# Patient Record
Sex: MALE | Race: WHITE | Employment: FULL TIME | URBAN - METROPOLITAN AREA
[De-identification: names, ages, dates, MRNs, and addresses within clinical notes are randomized per-mention and may not be internally consistent; named-entity substitution may affect disease eponyms.]

---

## 2022-01-28 ENCOUNTER — OFFICE VISIT (OUTPATIENT)
Dept: ORTHOPEDIC SURGERY | Age: 32
End: 2022-01-28
Payer: COMMERCIAL

## 2022-01-28 VITALS — HEIGHT: 72 IN | BODY MASS INDEX: 33.86 KG/M2 | WEIGHT: 250 LBS

## 2022-01-28 DIAGNOSIS — S93.402A MODERATE ANKLE SPRAIN, LEFT, INITIAL ENCOUNTER: ICD-10-CM

## 2022-01-28 DIAGNOSIS — M25.572 LEFT ANKLE PAIN, UNSPECIFIED CHRONICITY: Primary | ICD-10-CM

## 2022-01-28 PROCEDURE — 99213 OFFICE O/P EST LOW 20 MIN: CPT | Performed by: ORTHOPAEDIC SURGERY

## 2022-01-28 NOTE — PROGRESS NOTES
David Mejía (: 1990) is a 36 y.o. male, established patient, here for evaluation of the following chief complaint(s): Ankle Pain (left)       ASSESSMENT/PLAN:  Below is the assessment and plan developed based on review of pertinent history, physical exam, labs, studies, and medications. Findings were discussed with the patient today. We discussed regimen of ice, anti-inflammatories, physical therapy, home exercise program, and activity modifications. If there is continued pain and symptoms then we will plan for follow-up in the next 4-6 weeks for further evaluation and treatment planning. We discussed bracing that can help as well. 1. Left ankle pain, unspecified chronicity  -     XR ANKLE LT MIN 3 V; Future  2. Moderate ankle sprain, left, initial encounter      Return in about 4 weeks (around 2022), or if symptoms worsen or fail to improve. SUBJECTIVE/OBJECTIVE:  Estrella Davies (: 1990) is a 36 y.o. male. He notes an injury that occurred when he was playing ultimate Frisbee 2 weeks ago. He rolled his ankle and noted sharp swelling and aching pain in the ankle. He has tried ice and anti-inflammatories since this injury occurred. No Known Allergies    No current outpatient medications on file. No current facility-administered medications for this visit.        Social History     Socioeconomic History    Marital status: SINGLE     Spouse name: Not on file    Number of children: Not on file    Years of education: Not on file    Highest education level: Not on file   Occupational History    Not on file   Tobacco Use    Smoking status: Not on file    Smokeless tobacco: Not on file   Substance and Sexual Activity    Alcohol use: Not on file    Drug use: Not on file    Sexual activity: Not on file   Other Topics Concern    Not on file   Social History Narrative    Not on file     Social Determinants of Health     Financial Resource Strain:     Difficulty of Paying Living Expenses: Not on file   Food Insecurity:     Worried About Running Out of Food in the Last Year: Not on file    Ran Out of Food in the Last Year: Not on file   Transportation Needs:     Lack of Transportation (Medical): Not on file    Lack of Transportation (Non-Medical): Not on file   Physical Activity:     Days of Exercise per Week: Not on file    Minutes of Exercise per Session: Not on file   Stress:     Feeling of Stress : Not on file   Social Connections:     Frequency of Communication with Friends and Family: Not on file    Frequency of Social Gatherings with Friends and Family: Not on file    Attends Jehovah's witness Services: Not on file    Active Member of [address] [place] [address] [employer] or Organizations: Not on file    Attends Club or Organization Meetings: Not on file    Marital Status: Not on file   Intimate Partner Violence:     Fear of Current or Ex-Partner: Not on file    Emotionally Abused: Not on file    Physically Abused: Not on file    Sexually Abused: Not on file   Housing Stability:     Unable to Pay for Housing in the Last Year: Not on file    Number of [place] in the Last Year: Not on file    Unstable Housing in the Last Year: Not on file       History reviewed. No pertinent surgical history. History reviewed. No pertinent family history. REVIEW OF SYSTEMS:  ROS     Positive for: Musculoskeletal    Last edited by Yasmin Knapp on 1/28/2022  2:24 PM. (History)        Patient denies any recent fever, chills, nausea, vomiting, chest pain, or shortness of breath. Vitals:  Ht 6' (1.829 m)   Wt 250 lb (113.4 kg)   BMI 33.91 kg/m²    Body mass index is 33.91 kg/m². PHYSICAL EXAM:  General exam: Patient is awake, alert, and oriented x3. Well-appearing. No acute distress. Ambulates with a normal gait. Left ankle: Neurovascular and sensory intact. There is tenderness to palpation at the lateral ankle just distal to the fibula.   There is evidence of swelling and ecchymosis in this area. There is pain with calcaneal tilt testing. Mild tenderness to palpation at the medial ankle in the area of the deltoid ligament noted. No obvious instability on testing. Negative proximal squeeze test.  No tenderness to palpation at the fifth metatarsal.  Achilles tendon is palpable and intact. IMAGING:    XR Results (most recent):  Results from Appointment encounter on 01/28/22    XR ANKLE LT MIN 3 V    Narrative  X-rays of the left ankle three views done today show evidence of normal ankle joint space. No obvious acute fracture. No signs of medial clear space widening. Orders Placed This Encounter    XR ANKLE LT MIN 3 V     5     Standing Status:   Future     Number of Occurrences:   1     Standing Expiration Date:   5/28/2022              An electronic signature was used to authenticate this note.   -- Jonas Lunsford, DO

## 2022-06-01 ENCOUNTER — OFFICE VISIT (OUTPATIENT)
Dept: ORTHOPEDIC SURGERY | Age: 32
End: 2022-06-01
Payer: COMMERCIAL

## 2022-06-01 VITALS — HEIGHT: 72 IN | BODY MASS INDEX: 33.86 KG/M2 | WEIGHT: 250 LBS

## 2022-06-01 DIAGNOSIS — S93.402D MODERATE LEFT ANKLE SPRAIN, SUBSEQUENT ENCOUNTER: Primary | ICD-10-CM

## 2022-06-01 DIAGNOSIS — S76.311D HAMSTRING STRAIN, RIGHT, SUBSEQUENT ENCOUNTER: ICD-10-CM

## 2022-06-01 PROCEDURE — 99214 OFFICE O/P EST MOD 30 MIN: CPT | Performed by: ORTHOPAEDIC SURGERY

## 2022-06-01 RX ORDER — DICLOFENAC SODIUM 75 MG/1
75 TABLET, DELAYED RELEASE ORAL 2 TIMES DAILY
Qty: 60 TABLET | Refills: 3 | Status: SHIPPED | OUTPATIENT
Start: 2022-06-01

## 2022-06-01 NOTE — PROGRESS NOTES
Aleksander Mejía (: 1990) is a 29 y.o. male, established patient, here for evaluation of the following chief complaint(s): Ankle Pain       ASSESSMENT/PLAN:  Below is the assessment and plan developed based on review of pertinent history, physical exam, labs, studies, and medications. Findings were discussed with the patient today. We discussed regimen of ice, anti-inflammatories, physical therapy, home exercise program, and activity modifications. If there is continued pain and symptoms then we will plan for follow-up in the next 4-6 weeks for further evaluation and treatment planning. He will call us if he is continuing with left ankle pain and we will obtain an MRI to help with further treatment planning. This will help us to rule out an osteochondral defect. 1. Moderate left ankle sprain, subsequent encounter  -     REFERRAL TO PHYSICAL THERAPY  -     diclofenac EC (VOLTAREN) 75 mg EC tablet; Take 1 Tablet by mouth two (2) times a day., Normal, Disp-60 Tablet, R-3  2. Hamstring strain, right, subsequent encounter  -     REFERRAL TO PHYSICAL THERAPY  -     diclofenac EC (VOLTAREN) 75 mg EC tablet; Take 1 Tablet by mouth two (2) times a day., Normal, Disp-60 Tablet, R-3      Return in about 6 weeks (around 2022), or if symptoms worsen or fail to improve. SUBJECTIVE/OBJECTIVE:  Wayna Epley (: 1990) is a 29 y.o. male. She notes continued left ankle pain and swelling. He is now 5 months out from his initial injury. He has tried to increase his activity but has had difficulty with this. He describes anterior lateral and anterior medial ankle pain. No Known Allergies    Current Outpatient Medications   Medication Sig    diclofenac EC (VOLTAREN) 75 mg EC tablet Take 1 Tablet by mouth two (2) times a day. No current facility-administered medications for this visit.        Social History     Socioeconomic History    Marital status: SINGLE     Spouse name: Not on file    Number of children: Not on file    Years of education: Not on file    Highest education level: Not on file   Occupational History    Not on file   Tobacco Use    Smoking status: Not on file    Smokeless tobacco: Not on file   Substance and Sexual Activity    Alcohol use: Not on file    Drug use: Not on file    Sexual activity: Not on file   Other Topics Concern    Not on file   Social History Narrative    Not on file     Social Determinants of Health     Financial Resource Strain:     Difficulty of Paying Living Expenses: Not on file   Food Insecurity:     Worried About Running Out of Food in the Last Year: Not on file    Osmar of Food in the Last Year: Not on file   Transportation Needs:     Lack of Transportation (Medical): Not on file    Lack of Transportation (Non-Medical): Not on file   Physical Activity:     Days of Exercise per Week: Not on file    Minutes of Exercise per Session: Not on file   Stress:     Feeling of Stress : Not on file   Social Connections:     Frequency of Communication with Friends and Family: Not on file    Frequency of Social Gatherings with Friends and Family: Not on file    Attends Cheondoism Services: Not on file    Active Member of [address] [place] [address] or Organizations: Not on file    Attends Club or Organization Meetings: Not on file    Marital Status: Not on file   Intimate Partner Violence:     Fear of Current or Ex-Partner: Not on file    Emotionally Abused: Not on file    Physically Abused: Not on file    Sexually Abused: Not on file   Housing Stability:     Unable to Pay for Housing in the Last Year: Not on file    Number of [place] in the Last Year: Not on file    Unstable Housing in the Last Year: Not on file       History reviewed. No pertinent surgical history. History reviewed. No pertinent family history.             REVIEW OF SYSTEMS:  ROS     Positive for: Musculoskeletal    Last edited by Ayleen Tian on 6/1/2022  8:59 AM. (History) Patient denies any recent fever, chills, nausea, vomiting, chest pain, or shortness of breath. Vitals:  Ht 6' (1.829 m)   Wt 250 lb (113.4 kg)   BMI 33.91 kg/m²    Body mass index is 33.91 kg/m². PHYSICAL EXAM:  General exam: Patient is awake, alert, and oriented x3. Well-appearing. No acute distress. Ambulates with an antalgic gait    Right thigh: There is some continued tenderness palpation at the proximal hamstring origin. Normal stability of the hip and range of motion of the hip. Left ankle: Neurovascular and sensory intact. He does have continued tenderness palpation at the anterior ankle. Resisted dorsiflexion and eversion of the ankle recreates his pain. No obvious instability on testing. Mild swelling at the anterior lateral ankle. IMAGING:    XR Results (most recent):  Results from Appointment encounter on 01/28/22    XR ANKLE LT MIN 3 V    Narrative  X-rays of the left ankle three views done today show evidence of normal ankle joint space. No obvious acute fracture. No signs of medial clear space widening. Orders Placed This Encounter    REFERRAL TO PHYSICAL THERAPY     Referral Priority:   Routine     Referral Type:   PT/OT/ST     Referral Reason:   Specialty Services Required     Number of Visits Requested:   1    diclofenac EC (VOLTAREN) 75 mg EC tablet     Sig: Take 1 Tablet by mouth two (2) times a day. Dispense:  60 Tablet     Refill:  3              An electronic signature was used to authenticate this note.   -- Timothy Gracia DO

## 2022-07-18 ENCOUNTER — OFFICE VISIT (OUTPATIENT)
Dept: ORTHOPEDIC SURGERY | Age: 32
End: 2022-07-18
Payer: COMMERCIAL

## 2022-07-18 VITALS — HEIGHT: 72 IN | WEIGHT: 250 LBS | BODY MASS INDEX: 33.86 KG/M2

## 2022-07-18 DIAGNOSIS — S93.402D MODERATE LEFT ANKLE SPRAIN, SUBSEQUENT ENCOUNTER: Primary | ICD-10-CM

## 2022-07-18 DIAGNOSIS — M95.8 OSTEOCHONDRAL DEFECT OF TALUS: ICD-10-CM

## 2022-07-18 PROCEDURE — 99213 OFFICE O/P EST LOW 20 MIN: CPT | Performed by: ORTHOPAEDIC SURGERY

## 2022-07-18 NOTE — PROGRESS NOTES
Echo Mejía (: 1990) is a 29 y.o. male, established patient, here for evaluation of the following chief complaint(s): Ankle Pain and Leg Pain       ASSESSMENT/PLAN:  Below is the assessment and plan developed based on review of pertinent history, physical exam, labs, studies, and medications. We will obtain an MRI of the left ankle as I do have concerns for possible osteochondral defect versus partial tendon tear based on his continued pain despite 6 months of conservative treatment. I will plan to see him back after the MRI is performed. He has tried all conservative treatment options for his right hamstring strain as well. 1. Moderate left ankle sprain, subsequent encounter  2. Osteochondral defect of talus      Return for imaging results after study performed. SUBJECTIVE/OBJECTIVE:  Ramila Rene (: 1990) is a 29 y.o. male. He notes continued right hamstring pain and left ankle pain. He has tried months of conservative treatment including physical therapy, anti-inflammatories, and activity modifications. No Known Allergies    Current Outpatient Medications   Medication Sig    diclofenac EC (VOLTAREN) 75 mg EC tablet Take 1 Tablet by mouth two (2) times a day. No current facility-administered medications for this visit.        Social History     Socioeconomic History    Marital status: SINGLE     Spouse name: Not on file    Number of children: Not on file    Years of education: Not on file    Highest education level: Not on file   Occupational History    Not on file   Tobacco Use    Smoking status: Not on file    Smokeless tobacco: Not on file   Substance and Sexual Activity    Alcohol use: Not on file    Drug use: Not on file    Sexual activity: Not on file   Other Topics Concern    Not on file   Social History Narrative    Not on file     Social Determinants of Health     Financial Resource Strain:     Difficulty of Paying Living Expenses: Not on file Food Insecurity:     Worried About Running Out of Food in the Last Year: Not on file    Osmar of Food in the Last Year: Not on file   Transportation Needs:     Lack of Transportation (Medical): Not on file    Lack of Transportation (Non-Medical): Not on file   Physical Activity:     Days of Exercise per Week: Not on file    Minutes of Exercise per Session: Not on file   Stress:     Feeling of Stress : Not on file   Social Connections:     Frequency of Communication with Friends and Family: Not on file    Frequency of Social Gatherings with Friends and Family: Not on file    Attends Latter day Services: Not on file    Active Member of [address] [place] [address] [employer] or Organizations: Not on file    Attends Club or Organization Meetings: Not on file    Marital Status: Not on file   Intimate Partner Violence:     Fear of Current or Ex-Partner: Not on file    Emotionally Abused: Not on file    Physically Abused: Not on file    Sexually Abused: Not on file   Housing Stability:     Unable to Pay for Housing in the Last Year: Not on file    Number of [place] in the Last Year: Not on file    Unstable Housing in the Last Year: Not on file       No past surgical history on file. No family history on file. REVIEW OF SYSTEMS:  ROS     Positive for: Musculoskeletal    Last edited by Soledad Green on 7/18/2022  9:58 AM. (History)        Patient denies any recent fever, chills, nausea, vomiting, chest pain, or shortness of breath. Vitals:  Ht 6' (1.829 m)   Wt 250 lb (113.4 kg)   BMI 33.91 kg/m²    Body mass index is 33.91 kg/m². PHYSICAL EXAM:  General exam: Patient is awake, alert, and oriented x3. Well-appearing. No acute distress. Ambulates with a normal gait. Left ankle: He has generalized pain with range of motion of the ankle. Some limitation in dorsiflexion plantarflexion noted. Mild crepitus with motion. There is good strength with dorsiflexion and plantarflexion.     Right thigh: There is continued pain with resisted knee flexion. He localizes pain to the proximal hamstring. Normal strength and stability is noted. IMAGING:    XR Results (most recent):  Results from Appointment encounter on 01/28/22    XR ANKLE LT MIN 3 V    Narrative  X-rays of the left ankle three views done today show evidence of normal ankle joint space. No obvious acute fracture. No signs of medial clear space widening. No orders of the defined types were placed in this encounter. An electronic signature was used to authenticate this note.   -- Jesenia Nice DO

## 2022-07-19 DIAGNOSIS — M95.8 OSTEOCHONDRAL DEFECT OF TALUS: Primary | ICD-10-CM

## 2022-08-03 ENCOUNTER — HOSPITAL ENCOUNTER (OUTPATIENT)
Dept: MRI IMAGING | Age: 32
Discharge: HOME | End: 2022-08-03
Attending: ORTHOPAEDIC SURGERY
Payer: COMMERCIAL

## 2022-08-03 DIAGNOSIS — M95.8 OSTEOCHONDRAL DEFECT OF TALUS: ICD-10-CM

## 2022-08-03 PROCEDURE — 73721 MRI JNT OF LWR EXTRE W/O DYE: CPT

## 2022-08-26 ENCOUNTER — OFFICE VISIT (OUTPATIENT)
Dept: ORTHOPEDIC SURGERY | Age: 32
End: 2022-08-26
Payer: COMMERCIAL

## 2022-08-26 VITALS — WEIGHT: 250 LBS | HEIGHT: 72 IN | BODY MASS INDEX: 33.86 KG/M2

## 2022-08-26 DIAGNOSIS — M89.9 OSTEOCHONDRAL LESION OF TALAR DOME: Primary | ICD-10-CM

## 2022-08-26 DIAGNOSIS — G89.29 CHRONIC PAIN OF LEFT ANKLE: ICD-10-CM

## 2022-08-26 DIAGNOSIS — M25.572 CHRONIC PAIN OF LEFT ANKLE: ICD-10-CM

## 2022-08-26 DIAGNOSIS — M94.9 OSTEOCHONDRAL LESION OF TALAR DOME: Primary | ICD-10-CM

## 2022-08-26 PROCEDURE — 99213 OFFICE O/P EST LOW 20 MIN: CPT | Performed by: ORTHOPAEDIC SURGERY

## 2022-08-26 NOTE — LETTER
9/5/2022    Patient: Davina Vasquez   YOB: 1990   Date of Visit: 8/26/2022     DO Liliya Levi  [facility] [address] [place] [address]  Via In Basket    Dear Martin Adhikari DO,      Thank you for referring Mr. Crow Morgan to [facility] for evaluation. My notes for this consultation are attached. If you have questions, please do not hesitate to call me. I look forward to following your patient along with you.       Sincerely,    Christophe Sanchez MD

## 2022-08-26 NOTE — PROGRESS NOTES
Olivia Mejía (: 1990) is a 29 y.o. male, patient,here for evaluation of the following   Chief Complaint   Patient presents with    Ankle Pain        ASSESSMENT/PLAN:  Below is the assessment and plan developed based on review of pertinent history, physical exam, labs, studies, and medications. 1. Osteochondral lesion of talar dome  -     CT LOW EXT LT WO CONT; Future  2. Chronic pain of left ankle  -     XR STANDING ANKLE LT MIN 3 V; Future      The patient informed of findings on exam, x-rays and reviewed MRI in detail. This is a rather large osteochondral lesion, suspected reason for symptoms. We discussed surgical treatment, to further identify the exact size of the osteochondral lesion, I do recommend a CT scan without contrast as that will provide a better measuring of the talus to determine what type of procedure would be best for him. It is a possibility that he may require a medial malleolus osteotomy with the osteochondral lesion procedure which could be an allograft procedure. The CT scan will be more helpful in determining the exact size for surgical planning. In the meantime, activity modification, stretching program as demonstrated today and appropriate shoe wear and insoles. Further discussion about surgical plan will be had once the CT scan reviewed. Patient agrees with plan as he wishes to proceed with surgical treatment as this has become a chronic problem. Return for review CT scan when completed, treatment as indicated. No Known Allergies    Current Outpatient Medications   Medication Sig    diclofenac EC (VOLTAREN) 75 mg EC tablet Take 1 Tablet by mouth two (2) times a day. No current facility-administered medications for this visit. No past medical history on file. No past surgical history on file. No family history on file.     Social History     Socioeconomic History    Marital status: SINGLE     Spouse name: Not on file    Number of children: Not on file Years of education: Not on file    Highest education level: Not on file   Occupational History    Not on file   Tobacco Use    Smoking status: Not on file    Smokeless tobacco: Not on file   Substance and Sexual Activity    Alcohol use: Not on file    Drug use: Not on file    Sexual activity: Not on file   Other Topics Concern    Not on file   Social History Narrative    Not on file     Social Determinants of Health     Financial Resource Strain: Not on file   Food Insecurity: Not on file   Transportation Needs: Not on file   Physical Activity: Not on file   Stress: Not on file   Social Connections: Not on file   Intimate Partner Violence: Not on file   Housing Stability: Not on file           Vitals:  Ht 6' (1.829 m)   Wt 250 lb (113.4 kg)   BMI 33.91 kg/m²    Body mass index is 33.91 kg/m². SUBJECTIVE:  Florence Harjit (: 1990)   New patient presents today with complaint of left ankle pain and wants to discuss surgical treatment for an OCD lesion. Patient was seen by Dr. Marifer Pereira on August 3, 2022 and an MRI was obtained which identified osteochondral lesion. He describes initial injury back in 2022 as a \"bad sprain\" and he saw Dr. Marifer Pereira for that initial injury. He had persistent sharp pain so eventually the MRI was ordered. Patient states he was doing sports activities when he had this injury and physical therapy was something he went through recently and finished in 2022 but did not help his symptoms. He continues to have moderate sharp constant pain worse with stairs/steps, running walking. He has tried anti-inflammatory medications. He has been followed by Dr. Marifer Pereira from January to 2022. He is not diabetic, non-smoker. OBJECTIVE EXAM:     Visit Vitals  Ht 6' (1.829 m)   Wt 250 lb (113.4 kg)   BMI 33.91 kg/m²       Appearance: Alert, well appearing and pleasant patient who is in no distress, oriented to person, place/time, and who follows commands.  This patient is accompanied in the       office by his  self. Psychiatric: Affect and mood are appropriate. No dementia noted on examination  Musculoskeletal:  LOCATION: Diffuse tenderness to palpation around the ankle area, left lower extremity. Integumentary: No rashes, skin patches, wounds, or abrasions to the right or left legs       Warm and normal color. No regions of expressible drainage. Gait: Normal      Tenderness: No tenderness        Motor/Strength/Tone Exam: Normal       Sensory Exam:   Intact Normal Sensation to ankle/foot      Stability Testing: No anterolateral or varus instability of the Ankle or Subtalar Joints               No peroneal tendon instability noted      ROM: Normal ROM noted to ankle/foot      Contractures: No Achilles or Gastrocnemius Contractures      Calf tenderness: Absent for calf or gastrocnemius muscle regions       Soft, supple, non tender, non taut lower extremity compartment  Alignment:      NEUTRAL Hindfoot,         none Metatarsus Adductus Metatarsus   Wounds/Abrasions:    None present  Extremities:   No embolic phenomena to the toes          No significant edema to the foot and or toes. Lower extremities are warm and appear well perfused    DVT: No evidence of DVT seen on examination at this time     No calf swelling, no tenderness to calf muscles  Lymphatic:  No Evidence of Lymphedema  Vascular: Medial Border of Tibia Region: Edema is not present         Pulses: Dorsalis Pedis &  Posterior Tibial Pulses : Palpable yes        Varicosities Lower Limbs :  None  noted  Neuro: Negative bilateral Straight leg raise (seated position)    See Musculoskeletal section for pertinent individual extremity examination    No abnormal hand/wrist, foot/ankle, or facial/neck tremors. Lower Extremity/Ankle/Foot:  Mild antalgic gait, satisfactory weightbearing stance.     Left lower extremity/ankle: Not significant amount of swelling present, there is tenderness to palpation around the joint space and includes also the lateral side and medial aspect of ankle joint, no obvious clicking sensation on exam.  Ligaments are grossly stable for lateral talar tilt stress, there is a 1+ anterior drawer, medial and lateral malleolus nontender, Achilles tendon intact with negative Ulloa test, negative ankle squeeze test.    Left foot: Nontender, no swelling, ligaments grossly stable. Full active and passive range of motion toes, strength 5/5. Contralateral lower extremity/ankle /foot exam:  Nontender, no swelling ligaments grossly stable. Normal weightbearing stance. Neurovascular exam is grossly intact, dorsalis pedis pulse is palpable. Imaging:    XR Results (most recent):  Results from Appointment encounter on 08/26/22    XR STANDING ANKLE LT MIN 3 V    Narrative  Left ankle standing AP, lateral and oblique x-rays show no acute fractures or dislocations, on these views no obvious lateral talar dome osteochondral lesion, possible medial talar dome, osteochondral lesion notable on 2 views. Lateral view shows an os trigonum, normal joint space, there is a talar neck bone spur. Normal bone density, no acute abnormalities. The MRI without contrast dated August 3, 2022 is reviewed, these films are reviewed on PACS and it confirms an osteochondral lesion at the medial talar dome with reported extensive osteochondral derangement and there is instability to the area with a displaced fragment noted. Based on the MRI this is a rather large osteochondral lesion measuring about 18 mm on AP and about 6 mm in transverse alignment. There is some underlying osteoarthritis although not severe. There is chronic sprains of the ATFL and CFL. Might also be a chronic sprain of the distal tibiofibular ligament. The detailed radiology report in chart, summary is below.     IMPRESSION  Extensive osteochondral derangement of the medial talar dome with  concern for instability though no displaced fragment is definitely demonstrated. 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 2. Mild talonavicular osteoarthrosis. 3. Chronic sprains of anterior talofibular and calcaneofibular ligaments. There  may also be chronic sprain of the anterior distal tibiofibular ligament. An electronic signature was used to authenticate this note.   -- Rose Lewis MD

## 2022-09-06 ENCOUNTER — HOSPITAL ENCOUNTER (OUTPATIENT)
Dept: CT IMAGING | Age: 32
Discharge: HOME | End: 2022-09-06
Attending: ORTHOPAEDIC SURGERY
Payer: COMMERCIAL

## 2022-09-06 DIAGNOSIS — M94.9 OSTEOCHONDRAL LESION OF TALAR DOME: ICD-10-CM

## 2022-09-06 DIAGNOSIS — M89.9 OSTEOCHONDRAL LESION OF TALAR DOME: ICD-10-CM

## 2022-09-06 PROCEDURE — 73700 CT LOWER EXTREMITY W/O DYE: CPT

## 2022-09-22 ENCOUNTER — OFFICE VISIT (OUTPATIENT)
Dept: ORTHOPEDIC SURGERY | Age: 32
End: 2022-09-22
Payer: COMMERCIAL

## 2022-09-22 VITALS — HEIGHT: 72 IN | WEIGHT: 250 LBS | BODY MASS INDEX: 33.86 KG/M2

## 2022-09-22 DIAGNOSIS — M94.9 OSTEOCHONDRAL LESION OF TALAR DOME: Primary | ICD-10-CM

## 2022-09-22 DIAGNOSIS — G89.29 CHRONIC PAIN OF LEFT ANKLE: ICD-10-CM

## 2022-09-22 DIAGNOSIS — M89.9 OSTEOCHONDRAL LESION OF TALAR DOME: Primary | ICD-10-CM

## 2022-09-22 DIAGNOSIS — M25.572 CHRONIC PAIN OF LEFT ANKLE: ICD-10-CM

## 2022-09-22 PROCEDURE — 99213 OFFICE O/P EST LOW 20 MIN: CPT | Performed by: ORTHOPAEDIC SURGERY

## 2022-09-22 NOTE — PROGRESS NOTES
Echo Mejía (: 1990) is a 29 y.o. male, patient,here for evaluation of the following   Chief Complaint   Patient presents with    Arm Pain    Ankle Pain        ASSESSMENT/PLAN:  Below is the assessment and plan developed based on review of pertinent history, physical exam, labs, studies, and medications. 1. Osteochondral lesion of talar dome  2. Chronic pain of left ankle    Patient is informed of findings on exam, the x-rays were reviewed and we also reviewed the MRI and CT scan. Based on the CT scan this is a rather large osteochondral lesion that I would do best with allograft bone plug repair of the osteochondral lesion. The procedure would have to be done with an open procedure, probable arthroscopic assisted with an anterior versus medial ankle incision for open procedure. The medial incision will be for osteotomy of the medial malleolus if that is needed. Based on the CT and MRI reviewed, the lesion is posterior, so anticipate the possibility of needing to do a medial malleolus osteotomy to open up the medial side to do the procedure. This of course would then require fixation after procedure. All of this have been discussed with the patient at length, I discussed the surgical plan, risks, potential complications, expected outcomes, postoperative limitation time needed for recovery. All questions were answered, no guarantees are made, informed consent will be obtained for left ankle osteochondral talar dome defect repair with allograft bone plug, possible need for medial malleolus osteotomy. This will be done on an outpatient basis at [facility], the location may change pending the surgery can be done at the surgery center. Tentative date for surgery is 2022. When patient returns postoperatively we will get an x-ray of the left ankle 3 views nonweightbearing after the splint removed. Return for repeat xrays, post surgical follow up.     The patient has not undergone any procedures for this osteochondral lesion, we had scheduled for a more extensive procedure but after further evaluation, a decision was made to move forward with the more simple procedure first to evaluate the joint arthroscopically. This would be an arthroscopic osteochondral lesion evaluation and debridement as indicated. This will help also in future with other open procedures if the initial procedure fails. The patient has been rescheduled for surgical procedure to include only the left ankle arthroscopy osteochondral lesion debridement at [facility] on 2022, [place] [address]. No Known Allergies    Current Outpatient Medications   Medication Sig    diclofenac EC (VOLTAREN) 75 mg EC tablet Take 1 Tablet by mouth two (2) times a day. No current facility-administered medications for this visit. No past medical history on file. No past surgical history on file. No family history on file. Social History     Socioeconomic History    Marital status: SINGLE     Spouse name: Not on file    Number of children: Not on file    Years of education: Not on file    Highest education level: Not on file   Occupational History    Not on file   Tobacco Use    Smoking status: Not on file    Smokeless tobacco: Not on file   Substance and Sexual Activity    Alcohol use: Not on file    Drug use: Not on file    Sexual activity: Not on file   Other Topics Concern    Not on file   Social History Narrative    Not on file     Social Determinants of Health     Financial Resource Strain: Not on file   Food Insecurity: Not on file   Transportation Needs: Not on file   Physical Activity: Not on file   Stress: Not on file   Social Connections: Not on file   Intimate Partner Violence: Not on file   Housing Stability: Not on file           Vitals:  Ht 6' (1.829 m)   Wt 250 lb (113.4 kg)   BMI 33.91 kg/m²    Body mass index is 33.91 kg/m².             SUBJECTIVE:  Leah Mejía (: 1990)   Patient returns for follow-up after CT scan of left ankle obtained. Patient initially referred and presented on August 26, 2022 with complaint of left ankle pain and wanted to discuss surgical treatment for an OCD lesion. Patient was seen by Dr. Bernardo Cuadra on August 3, 2022 and an MRI was obtained which identified osteochondral lesion. He describes initial injury back in January 2022 as a \"bad sprain\" and he saw Dr. Bernardo Cuadra for that initial injury. He had persistent sharp pain so eventually the MRI was ordered. Patient states he was doing sports activities when he had this injury and physical therapy was something he went through recently and finished in July 2022 but did not help his symptoms. He continues to have moderate sharp constant pain worse with stairs/steps, running walking. He has tried anti-inflammatory medications. He has been followed by Dr. Bernardo Cuadra from January to August of 2022. He is not diabetic, non-smoker. CT scan was ordered to evaluate the exact size of the osteochondral lesion preop planning. OBJECTIVE EXAM:     Visit Vitals  Ht 6' (1.829 m)   Wt 250 lb (113.4 kg)   BMI 33.91 kg/m²       Appearance: Alert, well appearing and pleasant patient who is in no distress, oriented to person, place/time, and who follows commands. This patient is accompanied in the       office by his  self. Psychiatric: Affect and mood are appropriate. No dementia noted on examination  Musculoskeletal:  LOCATION: Diffuse tenderness anterior lateral and medial ankle joint of left lower extremity. Integumentary: No rashes, skin patches, wounds, or abrasions to the right or left legs       Warm and normal color. No regions of expressible drainage.       Gait: Normal      Tenderness: No tenderness        Motor/Strength/Tone Exam: Normal       Sensory Exam:   Intact Normal Sensation to ankle/foot      Stability Testing: No anterolateral or varus instability of the Ankle or Subtalar Joints               No peroneal tendon instability noted      ROM: Normal ROM noted to ankle/foot      Contractures: No Achilles or Gastrocnemius Contractures      Calf tenderness: Absent for calf or gastrocnemius muscle regions       Soft, supple, non tender, non taut lower extremity compartment  Alignment:      NEUTRAL Hindfoot,         none Metatarsus Adductus Metatarsus   Wounds/Abrasions:    None present  Extremities:   No embolic phenomena to the toes          No significant edema to the foot and or toes. Lower extremities are warm and appear well perfused    DVT: No evidence of DVT seen on examination at this time     No calf swelling, no tenderness to calf muscles  Lymphatic:  No Evidence of Lymphedema  Vascular: Medial Border of Tibia Region: Edema is not present         Pulses: Dorsalis Pedis &  Posterior Tibial Pulses : Palpable yes        Varicosities Lower Limbs :  None  noted  Neuro: Negative bilateral Straight leg raise (seated position)    See Musculoskeletal section for pertinent individual extremity examination    No abnormal hand/wrist, foot/ankle, or facial/neck tremors. Lower Extremity/Ankle/Foot:    Mild antalgic gait, satisfactory weightbearing stance. Left lower extremity/ankle: Not significant amount of swelling present, there is tenderness to palpation around the joint space and includes also the lateral side and medial aspect of ankle joint, no obvious clicking sensation on exam.  Ligaments are grossly stable for lateral talar tilt stress, there is a 1+ anterior drawer, medial and lateral malleolus nontender, Achilles tendon intact with negative Ulloa test, negative ankle squeeze test.     Left foot: Nontender, no swelling, ligaments grossly stable. Full active and passive range of motion toes, strength 5/5. Contralateral lower extremity/ankle /foot exam:  Nontender, no swelling ligaments grossly stable. Normal weightbearing stance.     Neurovascular exam intact for dorsalis pedis pulse palpable, light touch sensation intact, capillary refill brisk, strength for flexion/extension toes, dorsiflexion/plantarflexion ankle and foot 5/5. Imaging:    No x-rays obtained today but reviewed the previous x-rays obtained from August 26, 2022. XR Results (most recent):  Results from Appointment encounter on 08/26/22    XR STANDING ANKLE LT MIN 3 V    Narrative  Left ankle standing AP, lateral and oblique x-rays show no acute fractures or dislocations, on these views no obvious lateral talar dome osteochondral lesion, possible medial talar dome, osteochondral lesion notable on 2 views. Lateral view shows an os trigonum, normal joint space, there is a talar neck bone spur. Normal bone density, no acute abnormalities. MRI from previous evaluation also reviewed today to compare to the CT scan obtained on September 6, 2022. The CT scan is reviewed on PACS, it confirms the exact size of the osteochondral lesion which is measuring about 2.3 cm x 0.8 cm in axial dimension and about 0.9 cm in depth. There is also mild tibiotalar osteoarthritis with mild subtalar joint arthritis. The lesion is on the medial talar dome, the medial shoulder of the talus is intact. The MRI and CT scan summaries are below. The MRI without contrast dated August 3, 2022 is reviewed, these films are reviewed on PACS and it confirms an osteochondral lesion at the medial talar dome with reported extensive osteochondral derangement and there is instability to the area with a displaced fragment noted. Based on the MRI this is a rather large osteochondral lesion measuring about 18 mm on AP and about 6 mm in transverse alignment. There is some underlying osteoarthritis although not severe. There is chronic sprains of the ATFL and CFL. Might also be a chronic sprain of the distal tibiofibular ligament. The detailed radiology report in chart, summary is below.      IMPRESSION  Extensive osteochondral derangement of the medial talar dome with  concern for instability though no displaced fragment is definitely demonstrated. 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 2. Mild talonavicular osteoarthrosis. 3. Chronic sprains of anterior talofibular and calcaneofibular ligaments. There  may also be chronic sprain of the anterior distal tibiofibular ligament. An electronic signature was used to authenticate this note    IMPRESSION  1. Medial talar dome osteochondral lesion with measurements as above. Mild  tibiotalar and subtalar osteoarthritis. 2.  Chronic osseous fragment anterior to the medial malleolus. 3.  Suspected chronic sprain of ATFL and calcaneofibular ligament. An electronic signature was used to authenticate this note.   -- Doretha Ramsay MD

## 2022-09-28 DIAGNOSIS — M25.572 CHRONIC PAIN OF LEFT ANKLE: ICD-10-CM

## 2022-09-28 DIAGNOSIS — M89.9 OSTEOCHONDRAL LESION OF TALAR DOME: Primary | ICD-10-CM

## 2022-09-28 DIAGNOSIS — G89.29 CHRONIC PAIN OF LEFT ANKLE: ICD-10-CM

## 2022-09-28 DIAGNOSIS — M94.9 OSTEOCHONDRAL LESION OF TALAR DOME: Primary | ICD-10-CM

## 2022-10-12 DIAGNOSIS — M94.9 OSTEOCHONDRAL LESION OF TALAR DOME: Primary | ICD-10-CM

## 2022-10-12 DIAGNOSIS — M25.572 CHRONIC PAIN OF LEFT ANKLE: ICD-10-CM

## 2022-10-12 DIAGNOSIS — G89.29 CHRONIC PAIN OF LEFT ANKLE: ICD-10-CM

## 2022-10-12 DIAGNOSIS — M89.9 OSTEOCHONDRAL LESION OF TALAR DOME: Primary | ICD-10-CM

## 2022-10-24 DIAGNOSIS — G89.18 ACUTE POST-OPERATIVE PAIN: Primary | ICD-10-CM

## 2022-10-24 RX ORDER — OXYCODONE HYDROCHLORIDE 5 MG/1
5 TABLET ORAL
Qty: 20 TABLET | Refills: 0 | Status: SHIPPED | OUTPATIENT
Start: 2022-10-24 | End: 2022-10-27

## 2022-11-11 ENCOUNTER — OFFICE VISIT (OUTPATIENT)
Dept: ORTHOPEDIC SURGERY | Age: 32
End: 2022-11-11
Payer: COMMERCIAL

## 2022-11-11 VITALS — WEIGHT: 250 LBS | BODY MASS INDEX: 33.86 KG/M2 | HEIGHT: 72 IN

## 2022-11-11 DIAGNOSIS — M25.572 CHRONIC PAIN OF LEFT ANKLE: ICD-10-CM

## 2022-11-11 DIAGNOSIS — Z09 SURGICAL FOLLOW-UP CARE: Primary | ICD-10-CM

## 2022-11-11 DIAGNOSIS — M94.9 OSTEOCHONDRAL LESION OF TALAR DOME: ICD-10-CM

## 2022-11-11 DIAGNOSIS — M89.9 OSTEOCHONDRAL LESION OF TALAR DOME: ICD-10-CM

## 2022-11-11 DIAGNOSIS — G89.29 CHRONIC PAIN OF LEFT ANKLE: ICD-10-CM

## 2022-11-11 PROCEDURE — 99024 POSTOP FOLLOW-UP VISIT: CPT | Performed by: ORTHOPAEDIC SURGERY

## 2022-11-11 NOTE — PROGRESS NOTES
Riana Mejía (: 1990) is a 29 y.o. male, patient,here for evaluation of the following   Chief Complaint   Patient presents with    Surgical Follow-up    Ankle Pain        ASSESSMENT/PLAN:  Below is the assessment and plan developed based on review of pertinent history, physical exam, labs, studies, and medications. 1. Surgical follow-up care  -     XR ANKLE LT MIN 3 V; Future  2. Osteochondral lesion of talar dome  -     XR ANKLE LT MIN 3 V; Future  3. Chronic pain of left ankle  -     XR ANKLE LT MIN 3 V; Future      Patient informed of findings on exam today and also reviewed the arthroscopic photos that were obtained and I gave the photos to the patient for his records. We reviewed the size of the lesion and where the lesions are present, and briefly discussed the next step in treatment which is the allograft procedure if this arthroscopic procedure does not work well for him. He is definitely a candidate for the next procedure due to the size of the lesion. Further discussion will be had in the future for now were going to treat this as a postoperative evaluation and the sutures have been removed Steri-Strips applied and I will see him again in a few weeks, no x-rays are needed next time. Return in about 4 weeks (around 2022). No Known Allergies    Current Outpatient Medications   Medication Sig    diclofenac EC (VOLTAREN) 75 mg EC tablet Take 1 Tablet by mouth two (2) times a day. No current facility-administered medications for this visit. No past medical history on file. No past surgical history on file. No family history on file.     Social History     Socioeconomic History    Marital status: SINGLE     Spouse name: Not on file    Number of children: Not on file    Years of education: Not on file    Highest education level: Not on file   Occupational History    Not on file   Tobacco Use    Smoking status: Not on file    Smokeless tobacco: Not on file   Substance and Sexual Activity    Alcohol use: Not on file    Drug use: Not on file    Sexual activity: Not on file   Other Topics Concern    Not on file   Social History Narrative    Not on file     Social Determinants of Health     Financial Resource Strain: Not on file   Food Insecurity: Not on file   Transportation Needs: Not on file   Physical Activity: Not on file   Stress: Not on file   Social Connections: Not on file   Intimate Partner Violence: Not on file   Housing Stability: Not on file           Vitals:  Ht 6' (1.829 m)   Wt 250 lb (113.4 kg)   BMI 33.91 kg/m²    Body mass index is 33.91 kg/m². SUBJECTIVE:  Cy Goes Speeg (: 1990)   Patient has been 2 weeks postop, he underwent procedure 2 weeks ago which was arthroscopic debridement of osteochondral lesions. These were rather large lesions identified and debridement completed. Today he is doing okay he is not in severe pain and has no complaints. His main symptoms started around 2022 and has been ongoing and to present time. We had planned larger procedures of allograft but decided to move forward with the arthroscopic evaluation first to determine the amount of lesion present. Other procedures are tentatively scheduled for 2022. Overall he is doing very well that is post procedure, no chest pain or shortness of breath, calf pain or swelling. OBJECTIVE EXAM:     Visit Vitals  Ht 6' (1.829 m)   Wt 250 lb (113.4 kg)   BMI 33.91 kg/m²       Appearance: Alert, well appearing and pleasant patient who is in no distress, oriented to person, place/time, and who follows commands. This patient is accompanied in the       office by his  self. Psychiatric: Affect and mood are appropriate. No dementia noted on examination  Musculoskeletal:  LOCATION: Mild tenderness and swelling ankle - right      Integumentary: No rashes, skin patches, wounds, or abrasions to the right or left legs       Warm and normal color.  No regions of expressible drainage. Gait: Normal      Tenderness: No tenderness        Motor/Strength/Tone Exam: Normal       Sensory Exam:   Intact Normal Sensation to ankle/foot      Stability Testing: No anterolateral or varus instability of the Ankle or Subtalar Joints               No peroneal tendon instability noted      ROM: Normal ROM noted to ankle/foot      Contractures: No Achilles or Gastrocnemius Contractures      Calf tenderness: Absent for calf or gastrocnemius muscle regions       Soft, supple, non tender, non taut lower extremity compartment  Alignment:      NEUTRAL Hindfoot,         none Metatarsus Adductus Metatarsus   Wounds/Abrasions:    None present  Extremities:   No embolic phenomena to the toes          No significant edema to the foot and or toes. Lower extremities are warm and appear well perfused    DVT: No evidence of DVT seen on examination at this time     No calf swelling, no tenderness to calf muscles  Lymphatic:  No Evidence of Lymphedema  Vascular: Medial Border of Tibia Region: Edema is not present         Pulses: Dorsalis Pedis &  Posterior Tibial Pulses : Palpable yes        Varicosities Lower Limbs :  None  noted  Neuro: Negative bilateral Straight leg raise (seated position)    See Musculoskeletal section for pertinent individual extremity examination    No abnormal hand/wrist, foot/ankle, or facial/neck tremors. Lower Extremity/Ankle/Foot:  Antalgic gait, satisfactory weightbearing stance. Right lower extremity/ankle: There is minimal tenderness to the ankle and minimal swelling, incisions are well-healed, no signs of infection. Calves are soft nontender. Gentle range of motion intact of ankle joint. Right foot: No malalignment or deformity, nontender, swelling, normal exam.    Contralateral lower extremity/ankle /foot exam:  Nontender, no swelling ligaments grossly stable. Normal weightbearing stance. Neurovascular exam grossly intact.     Imaging:    XR Results (most recent):  Results from Appointment encounter on 11/11/22    XR ANKLE LT MIN 3 V    Narrative  Left ankle AP, lateral and oblique nonweightbearing x-rays show no obvious changes to the joint line, all looks good on these 3 views. There is mild soft tissue swelling status post arthroscopic procedure. An electronic signature was used to authenticate this note.   -- Nathan Vazquez MD

## 2022-12-09 ENCOUNTER — OFFICE VISIT (OUTPATIENT)
Dept: ORTHOPEDIC SURGERY | Age: 32
End: 2022-12-09
Payer: COMMERCIAL

## 2022-12-09 VITALS — WEIGHT: 250 LBS | HEIGHT: 72 IN | BODY MASS INDEX: 33.86 KG/M2

## 2022-12-09 DIAGNOSIS — Z09 SURGICAL FOLLOW-UP CARE: Primary | ICD-10-CM

## 2022-12-09 DIAGNOSIS — M89.9 OSTEOCHONDRAL LESION OF TALAR DOME: ICD-10-CM

## 2022-12-09 DIAGNOSIS — M94.9 OSTEOCHONDRAL LESION OF TALAR DOME: ICD-10-CM

## 2022-12-09 PROCEDURE — 99024 POSTOP FOLLOW-UP VISIT: CPT | Performed by: ORTHOPAEDIC SURGERY

## 2022-12-09 NOTE — PROGRESS NOTES
Bradofrd Mejía (: 1990) is a 29 y.o. male, patient,here for evaluation of the following   Chief Complaint   Patient presents with    Surgical Follow-up        ASSESSMENT/PLAN:  Below is the assessment and plan developed based on review of pertinent history, physical exam, labs, studies, and medications. 1. Surgical follow-up care  -     REFERRAL TO PHYSICAL THERAPY  2. Osteochondral lesion of talar dome  -     REFERRAL TO PHYSICAL THERAPY    She is doing okay, working progress to physical therapy program so we have referred to physical therapist today. Next time he returns we will get new x-rays of the left ankle 3 views weightbearing compared to contralateral ankle on AP view. Return in about 6 weeks (around 2023) for repeat xrays. No Known Allergies    Current Outpatient Medications   Medication Sig    diclofenac EC (VOLTAREN) 75 mg EC tablet Take 1 Tablet by mouth two (2) times a day. No current facility-administered medications for this visit. No past medical history on file. Past Surgical History:   Procedure Laterality Date    FOOT/TOES SURGERY PROC UNLISTED         No family history on file.     Social History     Socioeconomic History    Marital status: SINGLE     Spouse name: Not on file    Number of children: Not on file    Years of education: Not on file    Highest education level: Not on file   Occupational History    Not on file   Tobacco Use    Smoking status: Not on file    Smokeless tobacco: Not on file   Substance and Sexual Activity    Alcohol use: Not on file    Drug use: Not on file    Sexual activity: Not on file   Other Topics Concern    Not on file   Social History Narrative    Not on file     Social Determinants of Health     Financial Resource Strain: Not on file   Food Insecurity: Not on file   Transportation Needs: Not on file   Physical Activity: Not on file   Stress: Not on file   Social Connections: Not on file   Intimate Partner Violence: Not on file Housing Stability: Not on file           Vitals:  Ht 6' (1.829 m)   Wt 250 lb (113.4 kg)   BMI 33.91 kg/m²    Body mass index is 33.91 kg/m². SUBJECTIVE:  Kandi Mejía (: 1990)   Patient is back today for reevaluation status post left ankle osteochondral lesion arthroscopic debridement. He had been informed this was a rather large lesion but we went ahead to do the initial arthroscopic procedure without open procedure also to measure size of the lesion, and its position. Overall he is doing little better than prior to surgery. He has no new complaints today. He is tentatively scheduled for bigger procedure in 2023. OBJECTIVE EXAM:     Visit Vitals  Ht 6' (1.829 m)   Wt 250 lb (113.4 kg)   BMI 33.91 kg/m²       Appearance: Alert, well appearing and pleasant patient who is in no distress, oriented to person, place/time, and who follows commands. This patient is accompanied in the       office by his  self. Psychiatric: Affect and mood are appropriate. No dementia noted on examination  Musculoskeletal:  LOCATION: Minimal tenderness and swelling ankle - left      Integumentary: No rashes, skin patches, wounds, or abrasions to the right or left legs       Warm and normal color. No regions of expressible drainage.       Gait: Normal      Tenderness: No tenderness        Motor/Strength/Tone Exam: Normal       Sensory Exam:   Intact Normal Sensation to ankle/foot      Stability Testing: No anterolateral or varus instability of the Ankle or Subtalar Joints               No peroneal tendon instability noted      ROM: Normal ROM noted to ankle/foot      Contractures: No Achilles or Gastrocnemius Contractures      Calf tenderness: Absent for calf or gastrocnemius muscle regions       Soft, supple, non tender, non taut lower extremity compartment  Alignment:      NEUTRAL Hindfoot,         none Metatarsus Adductus Metatarsus   Wounds/Abrasions:    None present  Extremities:   No embolic phenomena to the toes          No significant edema to the foot and or toes. Lower extremities are warm and appear well perfused    DVT: No evidence of DVT seen on examination at this time     No calf swelling, no tenderness to calf muscles  Lymphatic:  No Evidence of Lymphedema  Vascular: Medial Border of Tibia Region: Edema is not present         Pulses: Dorsalis Pedis &  Posterior Tibial Pulses : Palpable yes        Varicosities Lower Limbs :  None  noted  Neuro: Negative bilateral Straight leg raise (seated position)    See Musculoskeletal section for pertinent individual extremity examination    No abnormal hand/wrist, foot/ankle, or facial/neck tremors. Lower Extremity/Ankle/Foot:  Mild antalgic gait, satisfactory weightbearing stance. Left lower extremity/ankle: There is improved active and passive range of motion intact, minimal tenderness and swelling, ligaments grossly stable. Incisions well-healed. Calf soft nontender. Left foot: Nontender, no swelling, normal exam.    Contralateral lower extremity/ankle /foot exam:  Nontender, no swelling ligaments grossly stable. Normal weightbearing stance. Neurovascular exam grossly intact. Imaging:    No x-rays obtained today. An electronic signature was used to authenticate this note.   -- Verena Bass MD

## 2023-01-19 ENCOUNTER — OFFICE VISIT (OUTPATIENT)
Dept: ORTHOPEDIC SURGERY | Age: 33
End: 2023-01-19
Payer: COMMERCIAL

## 2023-01-19 VITALS — HEIGHT: 72 IN | WEIGHT: 250 LBS | BODY MASS INDEX: 33.86 KG/M2

## 2023-01-19 DIAGNOSIS — M94.9 OSTEOCHONDRAL LESION OF TALAR DOME: ICD-10-CM

## 2023-01-19 DIAGNOSIS — Z09 SURGICAL FOLLOW-UP CARE: Primary | ICD-10-CM

## 2023-01-19 DIAGNOSIS — M89.9 OSTEOCHONDRAL LESION OF TALAR DOME: ICD-10-CM

## 2023-01-19 PROCEDURE — 99024 POSTOP FOLLOW-UP VISIT: CPT | Performed by: ORTHOPAEDIC SURGERY

## 2023-01-24 NOTE — PROGRESS NOTES
Yudy Mejía (: 1990) is a 29 y.o. male, patient,here for evaluation of the following   Chief Complaint   Patient presents with    Ankle Pain     left    Surgical Follow-up     Sx 10/24/22        ASSESSMENT/PLAN:  Below is the assessment and plan developed based on review of pertinent history, physical exam, labs, studies, and medications. 1. Surgical follow-up care  -     XR STANDING ANKLE LT MIN 3 V; Future  2. Osteochondral lesion of talar dome  -     XR STANDING ANKLE LT MIN 3 V; Future    Patient verbalized understanding of exam findings and treatment plan. We engaged in the shared decision-making process and treatment options were discussed at length with the patient. Surgical and conservative management discussed today along with risk and benefits. Patient is back today for reevaluation of almost 3 months since the procedure of arthroscopic debridement of the ankle, we were still planning to do an allograft type of procedure for the osteochondral lesion which is a very large lesion at the medial talar dome but we have not been able to arthritis at this time. Patient is doing okay today, I do want him to proceed with physical therapy to maximize that treatment. We will reassess again to see how he is doing in a few weeks and determine whether the neck step is still going to be the osteochondral lesion procedure with allograft bone plug. This procedure would also require a medial malleolus osteotomy. Provided a new referral to physical therapy today. Next time he returns no x-rays are needed. Return in about 4 weeks (around 2023). No Known Allergies    Current Outpatient Medications   Medication Sig    diclofenac EC (VOLTAREN) 75 mg EC tablet Take 1 Tablet by mouth two (2) times a day. No current facility-administered medications for this visit. No past medical history on file.     Past Surgical History:   Procedure Laterality Date    FOOT/TOES SURGERY PROC UNLISTED No family history on file. Social History     Socioeconomic History    Marital status: SINGLE     Spouse name: Not on file    Number of children: Not on file    Years of education: Not on file    Highest education level: Not on file   Occupational History    Not on file   Tobacco Use    Smoking status: Not on file    Smokeless tobacco: Not on file   Substance and Sexual Activity    Alcohol use: Not on file    Drug use: Not on file    Sexual activity: Not on file   Other Topics Concern    Not on file   Social History Narrative    Not on file     Social Determinants of Health     Financial Resource Strain: Not on file   Food Insecurity: Not on file   Transportation Needs: Not on file   Physical Activity: Not on file   Stress: Not on file   Social Connections: Not on file   Intimate Partner Violence: Not on file   Housing Stability: Not on file           Vitals:  Ht 6' (1.829 m)   Wt 250 lb (113.4 kg)   BMI 33.91 kg/m²    Body mass index is 33.91 kg/m². SUBJECTIVE:  Kenzie Clarita (: 1990)   Patient returns today for reevaluation of his left ankle status post procedure on 2022. He underwent arthroscopic debridement of a rather large osteochondral lesion. He is doing okay today, he still has some pain around the lateral ankle but not a lot of symptoms at the joint. He has been going through physical therapy program with some improvements. He is near 3 months since date of surgery. OBJECTIVE EXAM:     Visit Vitals  Ht 6' (1.829 m)   Wt 250 lb (113.4 kg)   BMI 33.91 kg/m²       Appearance: Alert, well appearing and pleasant patient who is in no distress, oriented to person, place/time, and who follows commands. This patient is accompanied in the       office by his  self. Psychiatric: Affect and mood are appropriate.  No dementia noted on examination  Musculoskeletal:  LOCATION: Minimal swelling and tenderness to palpation ankle - left      Integumentary: No rashes, skin patches, wounds, or abrasions to the right or left legs       Warm and normal color. No regions of expressible drainage. Gait: Normal      Tenderness: No tenderness        Motor/Strength/Tone Exam: Normal       Sensory Exam:   Intact Normal Sensation to ankle/foot      Stability Testing: No anterolateral or varus instability of the Ankle or Subtalar Joints               No peroneal tendon instability noted      ROM: Normal ROM noted to ankle/foot      Contractures: No Achilles or Gastrocnemius Contractures      Calf tenderness: Absent for calf or gastrocnemius muscle regions       Soft, supple, non tender, non taut lower extremity compartment  Alignment:      NEUTRAL Hindfoot,         none Metatarsus Adductus Metatarsus   Wounds/Abrasions:    None present  Extremities:   No embolic phenomena to the toes          No significant edema to the foot and or toes. Lower extremities are warm and appear well perfused    DVT: No evidence of DVT seen on examination at this time     No calf swelling, no tenderness to calf muscles  Lymphatic:  No Evidence of Lymphedema  Vascular: Medial Border of Tibia Region: Edema is not present         Pulses: Dorsalis Pedis &  Posterior Tibial Pulses : Palpable yes        Varicosities Lower Limbs :  None  noted  Neuro: Negative bilateral Straight leg raise (seated position)    See Musculoskeletal section for pertinent individual extremity examination    No abnormal hand/wrist, foot/ankle, or facial/neck tremors. Lower Extremity/Ankle/Foot:  Mild antalgic gait, satisfactory weightbearing stance. Left lower extremity/ankle: No malalignment or deformity, nontender, no swelling at the joint at this time, he still has some improvements to make for range of motion as his dorsiflexion is 5 degrees plantarflexion about 30 degrees, inversion eversion about 5 degrees in both directions, strength is 5/5.   He has tenderness along the peroneal tendons, there is slight discomfort with eversion against resistance, no gross instability to the tendon otherwise, Achilles tendon is intact with negative Ulloa test, negative ankle squeeze test.  Previous surgical incisions at the ankle portal incisions are well-healed. Left foot: There is no malalignment or deformity, nontender, no swelling, ligaments are grossly stable. Contralateral lower extremity/ankle /foot exam:  Nontender, no swelling ligaments grossly stable. Normal weightbearing stance. Neurovascular exam is grossly intact. Imaging:    XR Results (most recent):  Results from Appointment encounter on 01/19/23    XR STANDING ANKLE LT MIN 3 V    Narrative  Left ankle standing AP, lateral and oblique x-rays show the medial talar dome osteochondral lesion is still notable although stable and the joint space is satisfactory without significant narrowing. Satisfactory bone density. An electronic signature was used to authenticate this note.   -- Rose Lewis MD

## 2023-03-01 ENCOUNTER — OFFICE VISIT (OUTPATIENT)
Dept: ORTHOPEDIC SURGERY | Age: 33
End: 2023-03-01
Payer: COMMERCIAL

## 2023-03-01 VITALS — WEIGHT: 260 LBS | HEIGHT: 72 IN | BODY MASS INDEX: 35.21 KG/M2

## 2023-03-01 DIAGNOSIS — M89.9 OSTEOCHONDRAL LESION OF TALAR DOME: Primary | ICD-10-CM

## 2023-03-01 DIAGNOSIS — M94.9 OSTEOCHONDRAL LESION OF TALAR DOME: Primary | ICD-10-CM

## 2023-03-01 DIAGNOSIS — Z98.890 HISTORY OF ANKLE SURGERY: ICD-10-CM

## 2023-03-01 DIAGNOSIS — M76.72 PERONEAL TENDINITIS OF LEFT LOWER LEG: ICD-10-CM

## 2023-03-01 PROCEDURE — 99213 OFFICE O/P EST LOW 20 MIN: CPT | Performed by: ORTHOPAEDIC SURGERY

## 2023-03-01 NOTE — PROGRESS NOTES
Babatunde Mejía (: 1990) is a 34 y.o. male, patient,here for evaluation of the following   Chief Complaint   Patient presents with    Follow-up    Ankle Pain        ASSESSMENT/PLAN:  Below is the assessment and plan developed based on review of pertinent history, physical exam, labs, studies, and medications. 1. Osteochondral lesion of talar dome  -     XR STANDING ANKLE LT MIN 3 V; Future  -     REFERRAL TO PHYSICAL THERAPY  2. Peroneal tendinitis of left lower leg  -     REFERRAL TO PHYSICAL THERAPY  3. History of ankle surgery  -     XR STANDING ANKLE LT MIN 3 V; Future  -     REFERRAL TO PHYSICAL THERAPY    Patient verbalized understanding of exam findings and treatment plan. We engaged in the shared decision-making process and treatment options were discussed at length with the patient. Surgical and conservative management discussed today along with risk and benefits. Patient is back today for reevaluation, he continues to have pain at the medial talar dome and joint space where he has a rather large osteochondral lesion measuring 2.3 cm x 0.8 cm x 0.9 cm depth. He has undergone an arthroscopic debridement of this lesion but its not helping him for too long and recommended a talar dome bone plug graft instead. This procedure would also require a medial malleolus osteotomy to be able to get to the lesion which is more central medial talar dome. Patient is referred to a physical therapist for some peroneal tendon pain which appears to be tendinitis. He is informed that a new CT scan may be necessary to reevaluate the actual size again so that we have the appropriate sized talar dome plug. I think the talar dome plug would be reasonable instead of the entire talus.   This patient has had this problem since an injury of a moderate sprain in 2022 and he started the initial treatment with my partner for the moderate sprain and eventually he did not improve, an MRI was obtained which confirmed the diagnosis of osteochondral lesion, patient was referred to me for surgical treatment of this problem. The lesion was rather large and it was reconfirmed by CT scan which identified the above-mentioned size of the lesion, and initially scheduled the patient for allograft bone plug procedure but was not approved. He had a lot of pain to the ankle joint so I recommended moving forward with arthroscopic debridement which would also help to size the talar dome osteochondral lesion by visualization arthroscopically. The lesion is impressive, rather large lesion, he continues to have pain so I recommend the next step in treatment which is the allograft bone plug talar dome. A CT scan will be ordered again for preop planning. A date for surgery was not selected today as I wanted to get the CT scan first and then have the patient return to see me after that is done to discuss the exact plan and selected date for surgery. He agrees with this plan. In the meantime, as mentioned above, we will proceed with physical therapy for now to address the peroneal tendon pain. I discussed management options with the patient. All questions were answered to the best of my ability and to the patient's satisfaction. I discussed their history, symptoms, physical exam findings, diagnostic testing results, diagnosis and treatment options. The patient verbalized understanding and elected to proceed with surgical treatment as above. Return if symptoms worsen or fail to improve, for Review CT scan when completed, surgical planning. Aracely Womack No Known Allergies    Current Outpatient Medications   Medication Sig    ibuprofen (MOTRIN) 200 mg tablet Take 200 mg by mouth. No current facility-administered medications for this visit.        Past Medical History:   Diagnosis Date    Lymphoma ([facility] [address] 2013       Past Surgical History:   Procedure Laterality Date    OK UNLISTED PROCEDURE FOOT/TOES  10/2022    LEFT FOOT ARTHROSCOPY Family History   Problem Relation Age of Onset    Hypertension Father     Anesth Problems Neg Hx        Social History     Socioeconomic History    Marital status: SINGLE     Spouse name: Not on file    Number of children: Not on file    Years of education: Not on file    Highest education level: Not on file   Occupational History    Not on file   Tobacco Use    Smoking status: Never    Smokeless tobacco: Never   Substance and Sexual Activity    Alcohol use: Not on file     Comment: ONCE A WEEK    Drug use: Not Currently    Sexual activity: Not on file   Other Topics Concern    Not on file   Social History Narrative    Not on file     Social Determinants of Health     Financial Resource Strain: Not on file   Food Insecurity: Not on file   Transportation Needs: Not on file   Physical Activity: Not on file   Stress: Not on file   Social Connections: Not on file   Intimate Partner Violence: Not on file   Housing Stability: Not on file           Vitals:  Ht 6' (1.829 m)   Wt 260 lb (117.9 kg)   BMI 35.26 kg/m²    Body mass index is 35.26 kg/m². SUBJECTIVE:  Dafne Durbin (: 1990)   The patient is back today for reevaluation of the left lower extremity pain which persist at the ankle joint, he also has had pain along the lateral aspect of ankle for period of time now in line with the peroneal tendons. Today is the first time he mentions that he had pain that has been on going since the surgery along the lateral ankle. He underwent a surgical treatment of osteochondral lesion arthroscopic debridement on 2022. Initially we had scheduled for a more extensive procedure of allograft bone plug for the talus as this lesion is much larger. Since he was not authorized to undergo that procedure, I recommended debridement and exploration of the size of the lesion which was rather large lesion found at the medial talar dome.   He has had some recovery but not complete, he continues to have pain specifically at the medial talar dome. He is otherwise healthy male, no major medical problems. This problem has been ongoing since June 2022 when patient had a moderate left ankle sprain along with a hamstring strain and was initially evaluated and treated by my partner Dr. Angle Obrien. Patient was treated for the ankle sprain and eventually due to persistent pain an MRI was ordered. The MRI confirmed this rather large osteochondral lesion. His pain has been specifically at that area since the initial evaluation after Dr. Mike Rowan referred the patient to me for surgical treatment. OBJECTIVE EXAM:     Visit Vitals  Ht 6' (1.829 m)   Wt 260 lb (117.9 kg)   BMI 35.26 kg/m²       Appearance: Alert, well appearing and pleasant patient who is in no distress, oriented to person, place/time, and who follows commands. This patient is accompanied in the       office by his  self. Psychiatric: Affect and mood are appropriate. No dementia noted on examination  Musculoskeletal:  LOCATION: Diffuse tenderness with mild swelling medial joint space and talar dome, mild lateral ankle - left      Integumentary: No rashes, skin patches, wounds, or abrasions to the right or left legs       Warm and normal color. No regions of expressible drainage.       Gait: Normal      Tenderness: No tenderness        Motor/Strength/Tone Exam: Normal       Sensory Exam:   Intact Normal Sensation to ankle/foot      Stability Testing: No anterolateral or varus instability of the Ankle or Subtalar Joints               No peroneal tendon instability noted      ROM: Normal ROM noted to ankle/foot      Contractures: No Achilles or Gastrocnemius Contractures      Calf tenderness: Absent for calf or gastrocnemius muscle regions       Soft, supple, non tender, non taut lower extremity compartment  Alignment:      NEUTRAL Hindfoot,         none Metatarsus Adductus Metatarsus   Wounds/Abrasions:    None present  Extremities:   No embolic phenomena to the toes          No significant edema to the foot and or toes. Lower extremities are warm and appear well perfused    DVT: No evidence of DVT seen on examination at this time     No calf swelling, no tenderness to calf muscles  Lymphatic:  No Evidence of Lymphedema  Vascular: Medial Border of Tibia Region: Edema is not present         Pulses: Dorsalis Pedis &  Posterior Tibial Pulses : Palpable yes        Varicosities Lower Limbs :  None  noted  Neuro: Negative bilateral Straight leg raise (seated position)    See Musculoskeletal section for pertinent individual extremity examination    No abnormal hand/wrist, foot/ankle, or facial/neck tremors. Lower Extremity/Ankle/Foot:  Antalgic gait, satisfactory weightbearing stance. Left lower extremity/ankle: There is tenderness to palpation around the ankle joint space medial talar dome most significant, extreme plantarflexion and dorsiflexion results in discomfort in same area and there is mild swelling. Previous surgical incisions from the arthroscopic procedure is well-healed, the lateral talar dome mostly nontender but there is tenderness along the peroneal tendons today. Minimal swelling, no gross subluxation or dislocation of the peroneal tendons found. Tibia and fibula nontender. Area of ATFL and CFL and deltoid ligament mostly nontender. No severe instability for anterior drawer lateral talar tilt stress. Left foot: Normal weightbearing stance, nontender, no swelling, ligaments grossly stable. Able to flex and extend all toes with good range of motion and strength. Right lower extremity: Skin intact without erythema or wounds. 2+ dorsalis pedis pulse. Toes are warm, and well-perfused. Sensation is intact to light touch in the DP, SP, sural, saphenous, and tibial nerve distributions. 5/5 strength in ankle dorsiflexion, plantarflexion, inversion, and eversion.   Ankle range of motion is 10 degrees of dorsiflexion to 40 degrees of plantarflexion. Smooth and painless hindfoot and midfoot range of motion. No severe hallux, lesser toe malalignment or deformities, no pain with passive motion of lesser MTP joints, hallux MTP joint, no first TMT instability. Neurovascular exam is grossly intact for light touch sensation, capillary fill is brisk, dorsalis pedis pulse is 2+, toes well perfused, EHL/FHL 5/5. Imaging:    XR Results (most recent):  Results from Appointment encounter on 03/01/23    XR STANDING ANKLE LT MIN 3 V    Narrative  Left ankle AP, lateral and oblique standing x-rays compared to contralateral ankle on AP view shows normal ankle mortise and joint space. There is a medial talar dome lesion visible, may be slight changes compared to previous x-rays but about the same, there is mild soft tissue swelling at the area of medial ankle, lateral ankle does not show significant swelling. No acute fractures or dislocations, no acute abnormalities. Reviewed again the previous MRI and CT scan. MRI from previous evaluation also reviewed today to compare to the CT scan obtained on September 6, 2022. The CT scan is reviewed on PACS, it confirms the exact size of the osteochondral lesion which is measuring about 2.3 cm x 0.8 cm in axial dimension and about 0.9 cm in depth. There is also mild tibiotalar osteoarthritis with mild subtalar joint arthritis. The lesion is on the medial talar dome, the medial shoulder of the talus is intact. The MRI and CT scan summaries are below. The MRI without contrast dated August 3, 2022 is reviewed, these films are reviewed on PACS and it confirms an osteochondral lesion at the medial talar dome with reported extensive osteochondral derangement and there is instability to the area with a displaced fragment noted. Based on the MRI this is a rather large osteochondral lesion measuring about 18 mm on AP and about 6 mm in transverse alignment.   There is some underlying osteoarthritis although not severe. There is chronic sprains of the ATFL and CFL. Might also be a chronic sprain of the distal tibiofibular ligament. The detailed radiology report in chart, summary is below. IMPRESSION: MRI Left ankle on 8/3/2022. Extensive osteochondral derangement of the medial talar dome with  concern for instability though no displaced fragment is definitely demonstrated. he lesion is measuring 18 mm AP, lateral millimeters in transverse, 6 mm depth. 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 2. Mild talonavicular osteoarthrosis. 3. Chronic sprains of anterior talofibular and calcaneofibular ligaments. There  may also be chronic sprain of the anterior distal tibiofibular ligament. An electronic signature was used to authenticate this note     IMPRESSION: CT scan without contrast dated August 26, 2022. 1.  Medial talar dome osteochondral lesion with measurements approximately 2.3 cm x 0.8 cm in axial dimensions and 0.9 cm in depth. Mild tibiotalar and subtalar osteoarthritis. 2.  Chronic osseous fragment anterior to the medial malleolus. 3.  Suspected chronic sprain of ATFL and calcaneofibular ligament. An electronic signature was used to authenticate this note.   -- Lakeshia Sanabria MD

## 2023-03-15 ENCOUNTER — HOSPITAL ENCOUNTER (OUTPATIENT)
Dept: CT IMAGING | Age: 33
Discharge: HOME | End: 2023-03-15
Attending: ORTHOPAEDIC SURGERY
Payer: COMMERCIAL

## 2023-03-15 DIAGNOSIS — M89.9 OSTEOCHONDRAL LESION OF TALAR DOME: ICD-10-CM

## 2023-03-15 DIAGNOSIS — M76.72 PERONEAL TENDINITIS OF LEFT LOWER LEG: ICD-10-CM

## 2023-03-15 DIAGNOSIS — Z98.890 HISTORY OF ANKLE SURGERY: ICD-10-CM

## 2023-03-15 DIAGNOSIS — M94.9 OSTEOCHONDRAL LESION OF TALAR DOME: ICD-10-CM

## 2023-03-15 PROCEDURE — 73700 CT LOWER EXTREMITY W/O DYE: CPT

## 2023-04-25 ENCOUNTER — DOCUMENTATION ONLY (OUTPATIENT)
Dept: ORTHOPEDIC SURGERY | Age: 33
End: 2023-04-25

## 2023-04-25 NOTE — PROGRESS NOTES
Patient was contacted by phone today, he is informed the talar graft which is fresh talus that has to be used for a 16 mm size osteochondral defect of the left talus, scheduled for surgery on Friday, May 5, 2023 may not be available for that date. Patient informed he would have to be ready for surgery within 3 days upon availability of the graft if it is authorized by his insurance company. The patient understands and I will contact him by phone as soon as I know if the graft is available and I will may also have to adjust my schedule based on when the graft becomes available to do his surgery within that 3-day period.

## 2023-05-24 RX ORDER — IBUPROFEN 200 MG
200 TABLET ORAL
COMMUNITY